# Patient Record
Sex: MALE | Race: BLACK OR AFRICAN AMERICAN | NOT HISPANIC OR LATINO | URBAN - METROPOLITAN AREA
[De-identification: names, ages, dates, MRNs, and addresses within clinical notes are randomized per-mention and may not be internally consistent; named-entity substitution may affect disease eponyms.]

---

## 2017-04-12 ENCOUNTER — EMERGENCY (EMERGENCY)
Facility: HOSPITAL | Age: 52
LOS: 1 days | Discharge: HOME | End: 2017-04-12
Attending: EMERGENCY MEDICINE | Admitting: EMERGENCY MEDICINE
Payer: COMMERCIAL

## 2017-04-12 VITALS
TEMPERATURE: 98 F | OXYGEN SATURATION: 100 % | SYSTOLIC BLOOD PRESSURE: 139 MMHG | DIASTOLIC BLOOD PRESSURE: 88 MMHG | HEART RATE: 88 BPM | RESPIRATION RATE: 16 BRPM

## 2017-04-12 PROCEDURE — 99284 EMERGENCY DEPT VISIT MOD MDM: CPT

## 2017-04-12 PROCEDURE — 93971 EXTREMITY STUDY: CPT | Mod: 26,LT

## 2017-04-12 RX ORDER — IBUPROFEN 200 MG
600 TABLET ORAL ONCE
Qty: 0 | Refills: 0 | Status: COMPLETED | OUTPATIENT
Start: 2017-04-12 | End: 2017-04-12

## 2017-04-12 RX ADMIN — Medication 600 MILLIGRAM(S): at 16:18

## 2017-04-12 RX ADMIN — Medication 600 MILLIGRAM(S): at 15:48

## 2017-04-12 NOTE — ED PROVIDER NOTE - CARE PLAN
Principal Discharge DX:	Swelling of ankle  Instructions for follow-up, activity and diet:	Please follow up with your doctor for repeat US within one week if symptoms persist    Rest, drink plenty of fluids  Advance activity as tolerated  Continue all previously prescribed medications as directed  Follow up with your PMD 2-3 days- bring copies of your results  Return to the ER for worsening

## 2017-04-12 NOTE — ED PROVIDER NOTE - NS ED MD SCRIBE ATTENDING SCRIBE SECTIONS
HIV/PHYSICAL EXAM/VITAL SIGNS( Pullset)/PAST MEDICAL/SURGICAL/SOCIAL HISTORY/REVIEW OF SYSTEMS/HISTORY OF PRESENT ILLNESS/DISPOSITION

## 2017-04-12 NOTE — ED PROVIDER NOTE - ATTENDING CONTRIBUTION TO CARE
I performed the initial face to face bedside interview with this patient regarding history of present illness, review of symptoms and past medical, social and family history.  I completed an independent physical examination.  I was the initial provider who evaluated this patient.  The history, review of symptoms and examination was documented by the scribe in my presence and I attest to the accuracy of the documentation.  I have signed out the follow up of any pending tests (i.e. labs, radiological studies) to the PA.  I have discussed the patient’s plan of care and disposition with the PA. KATIE Lockwood MD

## 2017-04-12 NOTE — ED PROVIDER NOTE - OBJECTIVE STATEMENT
51 y/o M pt with PMHx of HTN presents to the ED for atraumatic right ankle pain, calf pain and swelling x2 months. No difficulty breathing or chest pain. No family history of DVT/PE. Has not taken any pain medication. Denies fever, redness, pruritis, rash, recent travel, or prolonged immobilization. NKDA. 51 y/o M pt with PMHx of HTN presents to the ED for atraumatic right ankle pain, calf pain  x2 months. This morning woke up with swelling of ankle.  No specific injury. No difficulty breathing or chest pain. No family history of DVT/PE. Has not taken any pain medication. Denies fever, redness, pruritis, rash, recent travel, or prolonged immobilization. NKDA.

## 2017-04-12 NOTE — ED PROVIDER NOTE - PROGRESS NOTE DETAILS
PREMA Jalloh: Received signout and discussed plan with Q[facility] attending. Ultrasound negative for DVT, pain well controlled. Stable for d/c home with followup. Explained importance of repeat US w/in one week if swelling persists. Pt has PMD, will f/u with him, Pt amenable with plan.

## 2017-04-12 NOTE — ED PROVIDER NOTE - MEDICAL DECISION MAKING DETAILS
Right ankle pain and swelling. Concern for DVT. Plan for US. Right ankle pain and swelling. Consider for DVT. Plan for US.

## 2017-04-12 NOTE — ED PROVIDER NOTE - PLAN OF CARE
Please follow up with your doctor for repeat US within one week if symptoms persist    Rest, drink plenty of fluids  Advance activity as tolerated  Continue all previously prescribed medications as directed  Follow up with your PMD 2-3 days- bring copies of your results  Return to the ER for worsening